# Patient Record
Sex: FEMALE | Race: OTHER | NOT HISPANIC OR LATINO
[De-identification: names, ages, dates, MRNs, and addresses within clinical notes are randomized per-mention and may not be internally consistent; named-entity substitution may affect disease eponyms.]

---

## 2023-10-09 ENCOUNTER — ASOB RESULT (OUTPATIENT)
Age: 33
End: 2023-10-09

## 2023-10-09 ENCOUNTER — APPOINTMENT (OUTPATIENT)
Dept: ANTEPARTUM | Facility: CLINIC | Age: 33
End: 2023-10-09
Payer: MEDICAID

## 2023-10-09 PROCEDURE — 93976 VASCULAR STUDY: CPT

## 2023-10-09 PROCEDURE — 76813 OB US NUCHAL MEAS 1 GEST: CPT

## 2023-12-05 ENCOUNTER — APPOINTMENT (OUTPATIENT)
Dept: ANTEPARTUM | Facility: CLINIC | Age: 33
End: 2023-12-05
Payer: MEDICAID

## 2023-12-05 ENCOUNTER — ASOB RESULT (OUTPATIENT)
Age: 33
End: 2023-12-05

## 2023-12-05 PROCEDURE — 76817 TRANSVAGINAL US OBSTETRIC: CPT

## 2023-12-05 PROCEDURE — 76805 OB US >/= 14 WKS SNGL FETUS: CPT

## 2024-01-30 ENCOUNTER — ASOB RESULT (OUTPATIENT)
Age: 34
End: 2024-01-30

## 2024-01-30 ENCOUNTER — APPOINTMENT (OUTPATIENT)
Dept: ANTEPARTUM | Facility: CLINIC | Age: 34
End: 2024-01-30
Payer: MEDICAID

## 2024-01-30 PROCEDURE — 76816 OB US FOLLOW-UP PER FETUS: CPT

## 2024-01-30 PROCEDURE — 76819 FETAL BIOPHYS PROFIL W/O NST: CPT

## 2024-01-30 PROCEDURE — 76820 UMBILICAL ARTERY ECHO: CPT | Mod: 59

## 2024-03-09 ENCOUNTER — OUTPATIENT (OUTPATIENT)
Dept: OUTPATIENT SERVICES | Facility: HOSPITAL | Age: 34
LOS: 1 days | End: 2024-03-09
Payer: MEDICARE

## 2024-03-09 VITALS
DIASTOLIC BLOOD PRESSURE: 83 MMHG | HEART RATE: 113 BPM | TEMPERATURE: 98 F | SYSTOLIC BLOOD PRESSURE: 129 MMHG | OXYGEN SATURATION: 97 % | RESPIRATION RATE: 18 BRPM

## 2024-03-09 VITALS
RESPIRATION RATE: 18 BRPM | DIASTOLIC BLOOD PRESSURE: 67 MMHG | OXYGEN SATURATION: 99 % | TEMPERATURE: 98 F | HEART RATE: 108 BPM | SYSTOLIC BLOOD PRESSURE: 114 MMHG

## 2024-03-09 DIAGNOSIS — O26.899 OTHER SPECIFIED PREGNANCY RELATED CONDITIONS, UNSPECIFIED TRIMESTER: ICD-10-CM

## 2024-03-09 LAB
ALBUMIN SERPL ELPH-MCNC: 3.6 G/DL — SIGNIFICANT CHANGE UP (ref 3.3–5)
ALP SERPL-CCNC: 141 U/L — HIGH (ref 40–120)
ALT FLD-CCNC: 13 U/L — SIGNIFICANT CHANGE UP (ref 10–45)
ANION GAP SERPL CALC-SCNC: 12 MMOL/L — SIGNIFICANT CHANGE UP (ref 5–17)
APPEARANCE UR: CLEAR — SIGNIFICANT CHANGE UP
APTT BLD: 26.2 SEC — SIGNIFICANT CHANGE UP (ref 24.5–35.6)
AST SERPL-CCNC: 12 U/L — SIGNIFICANT CHANGE UP (ref 10–40)
BACTERIA # UR AUTO: ABNORMAL /HPF
BASOPHILS # BLD AUTO: 0.01 K/UL — SIGNIFICANT CHANGE UP (ref 0–0.2)
BASOPHILS NFR BLD AUTO: 0.1 % — SIGNIFICANT CHANGE UP (ref 0–2)
BILIRUB SERPL-MCNC: 0.2 MG/DL — SIGNIFICANT CHANGE UP (ref 0.2–1.2)
BILIRUB UR-MCNC: NEGATIVE — SIGNIFICANT CHANGE UP
BLD GP AB SCN SERPL QL: NEGATIVE — SIGNIFICANT CHANGE UP
BUN SERPL-MCNC: 4 MG/DL — LOW (ref 7–23)
CALCIUM SERPL-MCNC: 8.8 MG/DL — SIGNIFICANT CHANGE UP (ref 8.4–10.5)
CAST: 2 /LPF — SIGNIFICANT CHANGE UP (ref 0–4)
CHLORIDE SERPL-SCNC: 104 MMOL/L — SIGNIFICANT CHANGE UP (ref 96–108)
CO2 SERPL-SCNC: 22 MMOL/L — SIGNIFICANT CHANGE UP (ref 22–31)
COLOR SPEC: YELLOW — SIGNIFICANT CHANGE UP
CREAT ?TM UR-MCNC: 68 MG/DL — SIGNIFICANT CHANGE UP
CREAT SERPL-MCNC: 0.48 MG/DL — LOW (ref 0.5–1.3)
DIFF PNL FLD: NEGATIVE — SIGNIFICANT CHANGE UP
EGFR: 127 ML/MIN/1.73M2 — SIGNIFICANT CHANGE UP
EOSINOPHIL # BLD AUTO: 0.04 K/UL — SIGNIFICANT CHANGE UP (ref 0–0.5)
EOSINOPHIL NFR BLD AUTO: 0.3 % — SIGNIFICANT CHANGE UP (ref 0–6)
FIBRINOGEN PPP-MCNC: 542 MG/DL — HIGH (ref 200–445)
GLUCOSE SERPL-MCNC: 83 MG/DL — SIGNIFICANT CHANGE UP (ref 70–99)
GLUCOSE UR QL: NEGATIVE MG/DL — SIGNIFICANT CHANGE UP
HCT VFR BLD CALC: 36.2 % — SIGNIFICANT CHANGE UP (ref 34.5–45)
HGB BLD-MCNC: 11.9 G/DL — SIGNIFICANT CHANGE UP (ref 11.5–15.5)
IMM GRANULOCYTES NFR BLD AUTO: 0.4 % — SIGNIFICANT CHANGE UP (ref 0–0.9)
INR BLD: 0.95 — SIGNIFICANT CHANGE UP (ref 0.85–1.18)
KETONES UR-MCNC: NEGATIVE MG/DL — SIGNIFICANT CHANGE UP
LDH SERPL L TO P-CCNC: 160 U/L — SIGNIFICANT CHANGE UP (ref 50–242)
LEUKOCYTE ESTERASE UR-ACNC: ABNORMAL
LYMPHOCYTES # BLD AUTO: 17.1 % — SIGNIFICANT CHANGE UP (ref 13–44)
LYMPHOCYTES # BLD AUTO: 2.37 K/UL — SIGNIFICANT CHANGE UP (ref 1–3.3)
MCHC RBC-ENTMCNC: 27.7 PG — SIGNIFICANT CHANGE UP (ref 27–34)
MCHC RBC-ENTMCNC: 32.9 GM/DL — SIGNIFICANT CHANGE UP (ref 32–36)
MCV RBC AUTO: 84.2 FL — SIGNIFICANT CHANGE UP (ref 80–100)
MONOCYTES # BLD AUTO: 0.88 K/UL — SIGNIFICANT CHANGE UP (ref 0–0.9)
MONOCYTES NFR BLD AUTO: 6.3 % — SIGNIFICANT CHANGE UP (ref 2–14)
NEUTROPHILS # BLD AUTO: 10.52 K/UL — HIGH (ref 1.8–7.4)
NEUTROPHILS NFR BLD AUTO: 75.8 % — SIGNIFICANT CHANGE UP (ref 43–77)
NITRITE UR-MCNC: NEGATIVE — SIGNIFICANT CHANGE UP
NRBC # BLD: 0 /100 WBCS — SIGNIFICANT CHANGE UP (ref 0–0)
PH UR: 7 — SIGNIFICANT CHANGE UP (ref 5–8)
PLATELET # BLD AUTO: 382 K/UL — SIGNIFICANT CHANGE UP (ref 150–400)
POTASSIUM SERPL-MCNC: 4 MMOL/L — SIGNIFICANT CHANGE UP (ref 3.5–5.3)
POTASSIUM SERPL-SCNC: 4 MMOL/L — SIGNIFICANT CHANGE UP (ref 3.5–5.3)
PROT ?TM UR-MCNC: 10 MG/DL — SIGNIFICANT CHANGE UP (ref 0–12)
PROT SERPL-MCNC: 6.6 G/DL — SIGNIFICANT CHANGE UP (ref 6–8.3)
PROT UR-MCNC: NEGATIVE MG/DL — SIGNIFICANT CHANGE UP
PROT/CREAT UR-RTO: 0.1 RATIO — SIGNIFICANT CHANGE UP (ref 0–0.2)
PROTHROM AB SERPL-ACNC: 10.9 SEC — SIGNIFICANT CHANGE UP (ref 9.5–13)
RBC # BLD: 4.3 M/UL — SIGNIFICANT CHANGE UP (ref 3.8–5.2)
RBC # FLD: 14.7 % — HIGH (ref 10.3–14.5)
RBC CASTS # UR COMP ASSIST: 0 /HPF — SIGNIFICANT CHANGE UP (ref 0–4)
RH IG SCN BLD-IMP: POSITIVE — SIGNIFICANT CHANGE UP
RH IG SCN BLD-IMP: POSITIVE — SIGNIFICANT CHANGE UP
SODIUM SERPL-SCNC: 138 MMOL/L — SIGNIFICANT CHANGE UP (ref 135–145)
SP GR SPEC: 1.01 — SIGNIFICANT CHANGE UP (ref 1–1.03)
SQUAMOUS # UR AUTO: 17 /HPF — HIGH (ref 0–5)
URATE SERPL-MCNC: 3.1 MG/DL — SIGNIFICANT CHANGE UP (ref 2.5–7)
UROBILINOGEN FLD QL: 0.2 MG/DL — SIGNIFICANT CHANGE UP (ref 0.2–1)
WBC # BLD: 13.88 K/UL — HIGH (ref 3.8–10.5)
WBC # FLD AUTO: 13.88 K/UL — HIGH (ref 3.8–10.5)
WBC UR QL: 4 /HPF — SIGNIFICANT CHANGE UP (ref 0–5)

## 2024-03-09 PROCEDURE — 81001 URINALYSIS AUTO W/SCOPE: CPT

## 2024-03-09 PROCEDURE — 86900 BLOOD TYPING SEROLOGIC ABO: CPT

## 2024-03-09 PROCEDURE — 85610 PROTHROMBIN TIME: CPT

## 2024-03-09 PROCEDURE — 82570 ASSAY OF URINE CREATININE: CPT

## 2024-03-09 PROCEDURE — 80053 COMPREHEN METABOLIC PANEL: CPT

## 2024-03-09 PROCEDURE — 83615 LACTATE (LD) (LDH) ENZYME: CPT

## 2024-03-09 PROCEDURE — 96374 THER/PROPH/DIAG INJ IV PUSH: CPT

## 2024-03-09 PROCEDURE — 96361 HYDRATE IV INFUSION ADD-ON: CPT

## 2024-03-09 PROCEDURE — 84156 ASSAY OF PROTEIN URINE: CPT

## 2024-03-09 PROCEDURE — 86901 BLOOD TYPING SEROLOGIC RH(D): CPT

## 2024-03-09 PROCEDURE — 76815 OB US LIMITED FETUS(S): CPT

## 2024-03-09 PROCEDURE — 85025 COMPLETE CBC W/AUTO DIFF WBC: CPT

## 2024-03-09 PROCEDURE — 36415 COLL VENOUS BLD VENIPUNCTURE: CPT

## 2024-03-09 PROCEDURE — 85730 THROMBOPLASTIN TIME PARTIAL: CPT

## 2024-03-09 PROCEDURE — 99214 OFFICE O/P EST MOD 30 MIN: CPT

## 2024-03-09 PROCEDURE — 84550 ASSAY OF BLOOD/URIC ACID: CPT

## 2024-03-09 PROCEDURE — 85384 FIBRINOGEN ACTIVITY: CPT

## 2024-03-09 PROCEDURE — 76818 FETAL BIOPHYS PROFILE W/NST: CPT

## 2024-03-09 PROCEDURE — 59025 FETAL NON-STRESS TEST: CPT

## 2024-03-09 PROCEDURE — 86850 RBC ANTIBODY SCREEN: CPT

## 2024-03-09 RX ORDER — SODIUM CHLORIDE 9 MG/ML
1000 INJECTION, SOLUTION INTRAVENOUS ONCE
Refills: 0 | Status: COMPLETED | OUTPATIENT
Start: 2024-03-09 | End: 2024-03-09

## 2024-03-09 RX ORDER — ACETAMINOPHEN 500 MG
1000 TABLET ORAL ONCE
Refills: 0 | Status: COMPLETED | OUTPATIENT
Start: 2024-03-09 | End: 2024-03-09

## 2024-03-09 RX ADMIN — SODIUM CHLORIDE 1000 MILLILITER(S): 9 INJECTION, SOLUTION INTRAVENOUS at 20:44

## 2024-03-09 RX ADMIN — Medication 400 MILLIGRAM(S): at 20:45

## 2024-03-09 NOTE — OB PROVIDER TRIAGE NOTE - NSOBPROVIDERNOTE_OBGYN_ALL_OB_FT
Subjective:  HPI: 33y yo Female  at 34w5d presents for lower abdominal pain. She woke up this morning with sharp pelvic pain that is constant all day. She does not think they are contraction-like, as she knows what contractions are due to her first pregnancy. She has not noticed anything makes it better or worse. She denies dysuria, frequency, urgency, headache, changes in vision, RUQ pain. Of note, she has a history of 2 prior  deliveries and denies true incisional pain but endorses pain 1 inch above her prio Pfannensteil incision.  - Fetal movement: endorses  - Contractions: denies  - Leakage of fluid: denies  - Vaginal bleeding: denies    Antepartum:  - Pregnancy type: spontaneous  - Testing: NIPT wnl   - Anatomy scan: wnl  - GCT/GTT: passed  - Hypertensive disorders of pregnancy: chronic hypertension (diagnosed ; currently taking labetalol 300mg BID; good control at home)  - GBS status: negative  - EFW: (2024) 1548g 69%ile    - ObHx: G1  primary C/S for arrest of dilation, BW 3500g; G2 2017 C/S  - GynHx: denies  - PMH: denies  - PSH: denies  - SH: denies toxic habits  - Meds: Prenatal vitamins; Labetalol 300mg BID  - Allergies: No Known Allergies        Objective:  T(C): 36.9 (24 @ 19:09), Max: 36.9 (24 @ 19:09)  HR: 113 (24 @ 19:09) (113 - 113)  BP: 129/83 (24 @ 19:09) (129/83 - 129/83)  RR: 18 (24 @ 19:09) (18 - 18)  SpO2: 97% (24 @ 19:09) (97% - 97%)  General: No apparent distress; Lying comfortably in bed  Pulmonary: No increased work of breathing  Abdomen: Soft; Gravid; CVA tenderness negative bilaterally; no rebound or guarding in all quadrants; tenderness to deep palpation above pubic symphysis  Extremities: Trace pedal edema bilaterally; No calf tenderness bilaterally  Neurological: Gross movements intact  Psychiatric: Appropriate mood and affect  Skin: No rashes or jaundice    SVE: closed/long  TAUS: cephalic fetus; posterior placenta; BPP 8/8; DVP 3.4cm (sonogram attached)    FHT: baseline 145; moderate variability; +accels; -decels; reactive and reassuring  TOCO: irregular contractions q1-3min on admission, transitioned to uterine irritability after IV hydration          Assessment:      Plan:  -   - GBS status as above:   - All questions were answered and concerns addressed. Patient verbally expressed understanding.  - Discussed with senior resident   - Discussed with attending physician

## 2024-03-09 NOTE — OB RN TRIAGE NOTE - NS_TRIAGEPROVIDERNOTIFIEDBY_OBGYN_ALL_OB_FT
Patient complaining of shortness of breath. SpO2 87% on room air. 2L oxygen applied via nasal cannula - SpO2 now 94%. Chest x-ray ordered by Dr Shelley.   MYKE Alexandre, RN

## 2024-03-10 RX ORDER — INFLUENZA VIRUS VACCINE 15; 15; 15; 15 UG/.5ML; UG/.5ML; UG/.5ML; UG/.5ML
0.5 SUSPENSION INTRAMUSCULAR ONCE
Refills: 0 | Status: DISCONTINUED | OUTPATIENT
Start: 2024-03-10 | End: 2024-03-24

## 2024-03-10 NOTE — DISCHARGE NOTE OB - PATIENT PORTAL LINK FT
You can access the FollowMyHealth Patient Portal offered by [facility] by registering at the following website: [email or web address]. By joining [employer]’s FollowMyHealth portal, you will also be able to view your health information using other applications (apps) compatible with our system.

## 2024-03-11 DIAGNOSIS — R10.2 PELVIC AND PERINEAL PAIN: ICD-10-CM

## 2024-03-11 DIAGNOSIS — O34.219 MATERNAL CARE FOR UNSPECIFIED TYPE SCAR FROM PREVIOUS CESAREAN DELIVERY: ICD-10-CM

## 2024-03-11 DIAGNOSIS — O10.913 UNSPECIFIED PRE-EXISTING HYPERTENSION COMPLICATING PREGNANCY, THIRD TRIMESTER: ICD-10-CM

## 2024-03-11 DIAGNOSIS — O09.523 SUPERVISION OF ELDERLY MULTIGRAVIDA, THIRD TRIMESTER: ICD-10-CM

## 2024-03-11 DIAGNOSIS — O26.893 OTHER SPECIFIED PREGNANCY RELATED CONDITIONS, THIRD TRIMESTER: ICD-10-CM

## 2024-03-11 DIAGNOSIS — Z3A.33 33 WEEKS GESTATION OF PREGNANCY: ICD-10-CM

## 2024-03-26 ENCOUNTER — APPOINTMENT (OUTPATIENT)
Dept: ANTEPARTUM | Facility: CLINIC | Age: 34
End: 2024-03-26
Payer: MEDICAID

## 2024-03-26 ENCOUNTER — ASOB RESULT (OUTPATIENT)
Age: 34
End: 2024-03-26

## 2024-03-26 PROCEDURE — 76816 OB US FOLLOW-UP PER FETUS: CPT

## 2024-03-26 PROCEDURE — 76820 UMBILICAL ARTERY ECHO: CPT | Mod: 59

## 2024-03-26 PROCEDURE — 76818 FETAL BIOPHYS PROFILE W/NST: CPT

## 2024-03-27 ENCOUNTER — INPATIENT (INPATIENT)
Facility: HOSPITAL | Age: 34
LOS: 2 days | Discharge: HOME | End: 2024-03-30
Attending: OBSTETRICS & GYNECOLOGY | Admitting: OBSTETRICS & GYNECOLOGY
Payer: COMMERCIAL

## 2024-03-27 ENCOUNTER — TRANSCRIPTION ENCOUNTER (OUTPATIENT)
Age: 34
End: 2024-03-27

## 2024-03-27 ENCOUNTER — EMERGENCY (EMERGENCY)
Facility: HOSPITAL | Age: 34
LOS: 1 days | Discharge: HOME | End: 2024-03-27
Attending: STUDENT IN AN ORGANIZED HEALTH CARE EDUCATION/TRAINING PROGRAM | Admitting: STUDENT IN AN ORGANIZED HEALTH CARE EDUCATION/TRAINING PROGRAM
Payer: COMMERCIAL

## 2024-03-27 VITALS
DIASTOLIC BLOOD PRESSURE: 85 MMHG | TEMPERATURE: 98 F | RESPIRATION RATE: 18 BRPM | HEART RATE: 102 BPM | SYSTOLIC BLOOD PRESSURE: 127 MMHG

## 2024-03-27 VITALS
WEIGHT: 181 LBS | DIASTOLIC BLOOD PRESSURE: 97 MMHG | OXYGEN SATURATION: 97 % | SYSTOLIC BLOOD PRESSURE: 144 MMHG | TEMPERATURE: 98 F | RESPIRATION RATE: 19 BRPM | HEIGHT: 62 IN | HEART RATE: 108 BPM

## 2024-03-27 DIAGNOSIS — Z3A.37 37 WEEKS GESTATION OF PREGNANCY: ICD-10-CM

## 2024-03-27 DIAGNOSIS — O47.1 FALSE LABOR AT OR AFTER 37 COMPLETED WEEKS OF GESTATION: ICD-10-CM

## 2024-03-27 DIAGNOSIS — I10 ESSENTIAL (PRIMARY) HYPERTENSION: ICD-10-CM

## 2024-03-27 DIAGNOSIS — O26.899 OTHER SPECIFIED PREGNANCY RELATED CONDITIONS, UNSPECIFIED TRIMESTER: ICD-10-CM

## 2024-03-27 PROCEDURE — 99282 EMERGENCY DEPT VISIT SF MDM: CPT

## 2024-03-27 PROCEDURE — 99283 EMERGENCY DEPT VISIT LOW MDM: CPT

## 2024-03-27 RX ORDER — SODIUM CHLORIDE 9 MG/ML
1000 INJECTION, SOLUTION INTRAVENOUS ONCE
Refills: 0 | Status: COMPLETED | OUTPATIENT
Start: 2024-03-27 | End: 2024-03-27

## 2024-03-27 NOTE — ED PROVIDER NOTE - CLINICAL SUMMARY MEDICAL DECISION MAKING FREE TEXT BOX
Pregnancy 37 wks. Contractions/pain for 3 hours.  No further w/u required in ED, will send to OB for further evaluation.

## 2024-03-27 NOTE — ED ADULT TRIAGE NOTE - CHIEF COMPLAINT QUOTE
patient presenting to the ED with pelvic pain x 3 hr. Patient is 37 weeks pregnant, denies bleeding, no nausea.

## 2024-03-27 NOTE — ED PROVIDER NOTE - OBJECTIVE STATEMENT
Yes
34 yo F , 27 wks pregnant w PMH of HTN, p/w contractions and abd pain for 3 hours. Pt states pains are intermittent, wrap around L-side of abd and L flank. No urinary symptoms, fever, n/v/d, or HA.

## 2024-03-27 NOTE — ED PROVIDER NOTE - PATIENT'S SEXUAL ORIENTATION
Medications have been prescribed to you today please pick them up at your pharmacy they have been sent electronically.  If your pharmacy advises you that any of these prescriptions require prior authorization please allow us approximately 10 business days to complete the prior authorization process and hear back from your insurance company.    If you require refills on medications prior to your next scheduled appointment please allow 48 to 72 hours for completion of this request and please login to the live well application through which we can communicate to you that the medications have been sent to the pharmacy.    If we have ordered diagnostic studies such as CT scans, MRIs, ultrasounds, mammograms, they often require prior authorization by your insurance company.  In order to facilitate us obtaining this authorization for you it is imperative for you to schedule the appointment and notify us of the time and date of the appointment so that we can complete the authorization process for you.  Should you fail to notify us of the time and date of your appointment cannot guarantee that this procedure will be properly authorized by your insurance company.     Unknown

## 2024-03-27 NOTE — ED PROVIDER NOTE - PHYSICAL EXAMINATION
CONSTITUTIONAL: Non-toxic; in no apparent distress  HEAD: Normocephalic; atraumatic  EYES: PERRL; EOM intact   ENMT: External appears normal  NECK: Supple; non-tender  CARD: Normal S1, S2; no murmurs, rubs, or gallops  RESP: Normal chest excursion with respiration; breath sounds clear and equal bilaterally  ABD: Soft, + gravid, Mild L-sided ttp  EXT: Normal ROM in all four extremities; non-tender to palpation, no peripheral edema  SKIN: Warm, dry, no rash  NEURO:  No focal neurological deficiencies

## 2024-03-28 DIAGNOSIS — Z98.891 HISTORY OF UTERINE SCAR FROM PREVIOUS SURGERY: Chronic | ICD-10-CM

## 2024-03-28 LAB
ALBUMIN SERPL ELPH-MCNC: 3.7 G/DL — SIGNIFICANT CHANGE UP (ref 3.3–5)
ALP SERPL-CCNC: 146 U/L — HIGH (ref 40–120)
ALT FLD-CCNC: 24 U/L — SIGNIFICANT CHANGE UP (ref 10–45)
ANION GAP SERPL CALC-SCNC: 12 MMOL/L — SIGNIFICANT CHANGE UP (ref 5–17)
APPEARANCE UR: ABNORMAL
APTT BLD: 25.5 SEC — SIGNIFICANT CHANGE UP (ref 24.5–35.6)
AST SERPL-CCNC: 16 U/L — SIGNIFICANT CHANGE UP (ref 10–40)
BACTERIA # UR AUTO: ABNORMAL /HPF
BASOPHILS # BLD AUTO: 0.03 K/UL — SIGNIFICANT CHANGE UP (ref 0–0.2)
BASOPHILS # BLD AUTO: 0.03 K/UL — SIGNIFICANT CHANGE UP (ref 0–0.2)
BASOPHILS NFR BLD AUTO: 0.2 % — SIGNIFICANT CHANGE UP (ref 0–2)
BASOPHILS NFR BLD AUTO: 0.3 % — SIGNIFICANT CHANGE UP (ref 0–2)
BILIRUB SERPL-MCNC: 0.2 MG/DL — SIGNIFICANT CHANGE UP (ref 0.2–1.2)
BILIRUB UR-MCNC: NEGATIVE — SIGNIFICANT CHANGE UP
BLD GP AB SCN SERPL QL: NEGATIVE — SIGNIFICANT CHANGE UP
BUN SERPL-MCNC: 7 MG/DL — SIGNIFICANT CHANGE UP (ref 7–23)
CALCIUM SERPL-MCNC: 9.5 MG/DL — SIGNIFICANT CHANGE UP (ref 8.4–10.5)
CAST: 1 /LPF — SIGNIFICANT CHANGE UP (ref 0–4)
CHLORIDE SERPL-SCNC: 104 MMOL/L — SIGNIFICANT CHANGE UP (ref 96–108)
CO2 SERPL-SCNC: 21 MMOL/L — LOW (ref 22–31)
COLOR SPEC: YELLOW — SIGNIFICANT CHANGE UP
CREAT ?TM UR-MCNC: 29 MG/DL — SIGNIFICANT CHANGE UP
CREAT SERPL-MCNC: 0.51 MG/DL — SIGNIFICANT CHANGE UP (ref 0.5–1.3)
DIFF PNL FLD: ABNORMAL
EGFR: 125 ML/MIN/1.73M2 — SIGNIFICANT CHANGE UP
EOSINOPHIL # BLD AUTO: 0 K/UL — SIGNIFICANT CHANGE UP (ref 0–0.5)
EOSINOPHIL # BLD AUTO: 0.14 K/UL — SIGNIFICANT CHANGE UP (ref 0–0.5)
EOSINOPHIL NFR BLD AUTO: 0 % — SIGNIFICANT CHANGE UP (ref 0–6)
EOSINOPHIL NFR BLD AUTO: 1.5 % — SIGNIFICANT CHANGE UP (ref 0–6)
FIBRINOGEN PPP-MCNC: 485 MG/DL — HIGH (ref 200–445)
GLUCOSE SERPL-MCNC: 107 MG/DL — HIGH (ref 70–99)
GLUCOSE UR QL: NEGATIVE MG/DL — SIGNIFICANT CHANGE UP
HCT VFR BLD CALC: 35.5 % — SIGNIFICANT CHANGE UP (ref 34.5–45)
HCT VFR BLD CALC: 36.2 % — SIGNIFICANT CHANGE UP (ref 34.5–45)
HGB BLD-MCNC: 11.8 G/DL — SIGNIFICANT CHANGE UP (ref 11.5–15.5)
HGB BLD-MCNC: 11.9 G/DL — SIGNIFICANT CHANGE UP (ref 11.5–15.5)
IMM GRANULOCYTES NFR BLD AUTO: 0.4 % — SIGNIFICANT CHANGE UP (ref 0–0.9)
IMM GRANULOCYTES NFR BLD AUTO: 0.9 % — SIGNIFICANT CHANGE UP (ref 0–0.9)
INR BLD: 0.92 — SIGNIFICANT CHANGE UP (ref 0.85–1.18)
KETONES UR-MCNC: NEGATIVE MG/DL — SIGNIFICANT CHANGE UP
LDH SERPL L TO P-CCNC: 146 U/L — SIGNIFICANT CHANGE UP (ref 50–242)
LEUKOCYTE ESTERASE UR-ACNC: NEGATIVE — SIGNIFICANT CHANGE UP
LYMPHOCYTES # BLD AUTO: 1.16 K/UL — SIGNIFICANT CHANGE UP (ref 1–3.3)
LYMPHOCYTES # BLD AUTO: 2.14 K/UL — SIGNIFICANT CHANGE UP (ref 1–3.3)
LYMPHOCYTES # BLD AUTO: 23.4 % — SIGNIFICANT CHANGE UP (ref 13–44)
LYMPHOCYTES # BLD AUTO: 7 % — LOW (ref 13–44)
MCHC RBC-ENTMCNC: 27.5 PG — SIGNIFICANT CHANGE UP (ref 27–34)
MCHC RBC-ENTMCNC: 27.9 PG — SIGNIFICANT CHANGE UP (ref 27–34)
MCHC RBC-ENTMCNC: 32.6 GM/DL — SIGNIFICANT CHANGE UP (ref 32–36)
MCHC RBC-ENTMCNC: 33.5 GM/DL — SIGNIFICANT CHANGE UP (ref 32–36)
MCV RBC AUTO: 83.1 FL — SIGNIFICANT CHANGE UP (ref 80–100)
MCV RBC AUTO: 84.4 FL — SIGNIFICANT CHANGE UP (ref 80–100)
MONOCYTES # BLD AUTO: 0.67 K/UL — SIGNIFICANT CHANGE UP (ref 0–0.9)
MONOCYTES # BLD AUTO: 0.87 K/UL — SIGNIFICANT CHANGE UP (ref 0–0.9)
MONOCYTES NFR BLD AUTO: 4.1 % — SIGNIFICANT CHANGE UP (ref 2–14)
MONOCYTES NFR BLD AUTO: 9.5 % — SIGNIFICANT CHANGE UP (ref 2–14)
NEUTROPHILS # BLD AUTO: 14.54 K/UL — HIGH (ref 1.8–7.4)
NEUTROPHILS # BLD AUTO: 5.9 K/UL — SIGNIFICANT CHANGE UP (ref 1.8–7.4)
NEUTROPHILS NFR BLD AUTO: 64.4 % — SIGNIFICANT CHANGE UP (ref 43–77)
NEUTROPHILS NFR BLD AUTO: 88.3 % — HIGH (ref 43–77)
NITRITE UR-MCNC: NEGATIVE — SIGNIFICANT CHANGE UP
NRBC # BLD: 0 /100 WBCS — SIGNIFICANT CHANGE UP (ref 0–0)
NRBC # BLD: 0 /100 WBCS — SIGNIFICANT CHANGE UP (ref 0–0)
PH UR: 7 — SIGNIFICANT CHANGE UP (ref 5–8)
PLATELET # BLD AUTO: 327 K/UL — SIGNIFICANT CHANGE UP (ref 150–400)
PLATELET # BLD AUTO: 329 K/UL — SIGNIFICANT CHANGE UP (ref 150–400)
POTASSIUM SERPL-MCNC: 4 MMOL/L — SIGNIFICANT CHANGE UP (ref 3.5–5.3)
POTASSIUM SERPL-SCNC: 4 MMOL/L — SIGNIFICANT CHANGE UP (ref 3.5–5.3)
PROT ?TM UR-MCNC: 6 MG/DL — SIGNIFICANT CHANGE UP (ref 0–12)
PROT SERPL-MCNC: 6.8 G/DL — SIGNIFICANT CHANGE UP (ref 6–8.3)
PROT UR-MCNC: NEGATIVE MG/DL — SIGNIFICANT CHANGE UP
PROT/CREAT UR-RTO: 0.2 RATIO — SIGNIFICANT CHANGE UP (ref 0–0.2)
PROTHROM AB SERPL-ACNC: 10.5 SEC — SIGNIFICANT CHANGE UP (ref 9.5–13)
RBC # BLD: 4.27 M/UL — SIGNIFICANT CHANGE UP (ref 3.8–5.2)
RBC # BLD: 4.29 M/UL — SIGNIFICANT CHANGE UP (ref 3.8–5.2)
RBC # FLD: 14.9 % — HIGH (ref 10.3–14.5)
RBC # FLD: 14.9 % — HIGH (ref 10.3–14.5)
RBC CASTS # UR COMP ASSIST: 1 /HPF — SIGNIFICANT CHANGE UP (ref 0–4)
RH IG SCN BLD-IMP: POSITIVE — SIGNIFICANT CHANGE UP
SODIUM SERPL-SCNC: 137 MMOL/L — SIGNIFICANT CHANGE UP (ref 135–145)
SP GR SPEC: 1.01 — SIGNIFICANT CHANGE UP (ref 1–1.03)
SQUAMOUS # UR AUTO: >36 /HPF — HIGH (ref 0–5)
URATE SERPL-MCNC: 3.7 MG/DL — SIGNIFICANT CHANGE UP (ref 2.5–7)
UROBILINOGEN FLD QL: 0.2 MG/DL — SIGNIFICANT CHANGE UP (ref 0.2–1)
WBC # BLD: 16.46 K/UL — HIGH (ref 3.8–10.5)
WBC # BLD: 9.16 K/UL — SIGNIFICANT CHANGE UP (ref 3.8–10.5)
WBC # FLD AUTO: 16.46 K/UL — HIGH (ref 3.8–10.5)
WBC # FLD AUTO: 9.16 K/UL — SIGNIFICANT CHANGE UP (ref 3.8–10.5)
WBC UR QL: 4 /HPF — SIGNIFICANT CHANGE UP (ref 0–5)

## 2024-03-28 PROCEDURE — 88304 TISSUE EXAM BY PATHOLOGIST: CPT | Mod: 26

## 2024-03-28 PROCEDURE — 88307 TISSUE EXAM BY PATHOLOGIST: CPT | Mod: 26

## 2024-03-28 RX ORDER — DIPHENHYDRAMINE HCL 50 MG
25 CAPSULE ORAL EVERY 6 HOURS
Refills: 0 | Status: DISCONTINUED | OUTPATIENT
Start: 2024-03-28 | End: 2024-03-30

## 2024-03-28 RX ORDER — DEXAMETHASONE 0.5 MG/5ML
4 ELIXIR ORAL EVERY 6 HOURS
Refills: 0 | Status: DISCONTINUED | OUTPATIENT
Start: 2024-03-28 | End: 2024-03-30

## 2024-03-28 RX ORDER — ACETAMINOPHEN 500 MG
975 TABLET ORAL
Refills: 0 | Status: DISCONTINUED | OUTPATIENT
Start: 2024-03-28 | End: 2024-03-30

## 2024-03-28 RX ORDER — SODIUM CHLORIDE 9 MG/ML
1000 INJECTION, SOLUTION INTRAVENOUS
Refills: 0 | Status: DISCONTINUED | OUTPATIENT
Start: 2024-03-28 | End: 2024-03-30

## 2024-03-28 RX ORDER — OXYCODONE HYDROCHLORIDE 5 MG/1
5 TABLET ORAL
Refills: 0 | Status: DISCONTINUED | OUTPATIENT
Start: 2024-03-28 | End: 2024-03-30

## 2024-03-28 RX ORDER — ONDANSETRON 8 MG/1
4 TABLET, FILM COATED ORAL EVERY 6 HOURS
Refills: 0 | Status: DISCONTINUED | OUTPATIENT
Start: 2024-03-28 | End: 2024-03-30

## 2024-03-28 RX ORDER — LABETALOL HCL 100 MG
300 TABLET ORAL EVERY 12 HOURS
Refills: 0 | Status: DISCONTINUED | OUTPATIENT
Start: 2024-03-28 | End: 2024-03-30

## 2024-03-28 RX ORDER — LANOLIN
1 OINTMENT (GRAM) TOPICAL EVERY 6 HOURS
Refills: 0 | Status: DISCONTINUED | OUTPATIENT
Start: 2024-03-28 | End: 2024-03-30

## 2024-03-28 RX ORDER — SODIUM CHLORIDE 9 MG/ML
1000 INJECTION, SOLUTION INTRAVENOUS ONCE
Refills: 0 | Status: COMPLETED | OUTPATIENT
Start: 2024-03-28 | End: 2024-03-28

## 2024-03-28 RX ORDER — KETOROLAC TROMETHAMINE 30 MG/ML
30 SYRINGE (ML) INJECTION EVERY 6 HOURS
Refills: 0 | Status: DISCONTINUED | OUTPATIENT
Start: 2024-03-28 | End: 2024-03-28

## 2024-03-28 RX ORDER — OXYTOCIN 10 UNIT/ML
333.33 VIAL (ML) INJECTION
Qty: 20 | Refills: 0 | Status: DISCONTINUED | OUTPATIENT
Start: 2024-03-28 | End: 2024-03-30

## 2024-03-28 RX ORDER — LABETALOL HCL 100 MG
1 TABLET ORAL
Refills: 0 | DISCHARGE

## 2024-03-28 RX ORDER — MAGNESIUM HYDROXIDE 400 MG/1
30 TABLET, CHEWABLE ORAL
Refills: 0 | Status: DISCONTINUED | OUTPATIENT
Start: 2024-03-28 | End: 2024-03-30

## 2024-03-28 RX ORDER — CHLORHEXIDINE GLUCONATE 213 G/1000ML
1 SOLUTION TOPICAL DAILY
Refills: 0 | Status: DISCONTINUED | OUTPATIENT
Start: 2024-03-28 | End: 2024-03-30

## 2024-03-28 RX ORDER — CEFAZOLIN SODIUM 1 G
3000 VIAL (EA) INJECTION ONCE
Refills: 0 | Status: COMPLETED | OUTPATIENT
Start: 2024-03-28 | End: 2024-03-28

## 2024-03-28 RX ORDER — TETANUS TOXOID, REDUCED DIPHTHERIA TOXOID AND ACELLULAR PERTUSSIS VACCINE, ADSORBED 5; 2.5; 8; 8; 2.5 [IU]/.5ML; [IU]/.5ML; UG/.5ML; UG/.5ML; UG/.5ML
0.5 SUSPENSION INTRAMUSCULAR ONCE
Refills: 0 | Status: DISCONTINUED | OUTPATIENT
Start: 2024-03-28 | End: 2024-03-30

## 2024-03-28 RX ORDER — SIMETHICONE 80 MG/1
80 TABLET, CHEWABLE ORAL EVERY 4 HOURS
Refills: 0 | Status: DISCONTINUED | OUTPATIENT
Start: 2024-03-28 | End: 2024-03-30

## 2024-03-28 RX ORDER — CITRIC ACID/SODIUM CITRATE 300-500 MG
30 SOLUTION, ORAL ORAL ONCE
Refills: 0 | Status: COMPLETED | OUTPATIENT
Start: 2024-03-28 | End: 2024-03-28

## 2024-03-28 RX ORDER — FAMOTIDINE 10 MG/ML
20 INJECTION INTRAVENOUS ONCE
Refills: 0 | Status: COMPLETED | OUTPATIENT
Start: 2024-03-28 | End: 2024-03-28

## 2024-03-28 RX ORDER — NALOXONE HYDROCHLORIDE 4 MG/.1ML
0.1 SPRAY NASAL
Refills: 0 | Status: DISCONTINUED | OUTPATIENT
Start: 2024-03-28 | End: 2024-03-30

## 2024-03-28 RX ORDER — IBUPROFEN 200 MG
600 TABLET ORAL EVERY 6 HOURS
Refills: 0 | Status: COMPLETED | OUTPATIENT
Start: 2024-03-28 | End: 2025-02-24

## 2024-03-28 RX ORDER — OXYTOCIN 10 UNIT/ML
333.33 VIAL (ML) INJECTION
Qty: 20 | Refills: 0 | Status: DISCONTINUED | OUTPATIENT
Start: 2024-03-28 | End: 2024-03-28

## 2024-03-28 RX ADMIN — Medication 30 MILLIGRAM(S): at 23:32

## 2024-03-28 RX ADMIN — SODIUM CHLORIDE 1000 MILLILITER(S): 9 INJECTION, SOLUTION INTRAVENOUS at 00:10

## 2024-03-28 RX ADMIN — Medication 975 MILLIGRAM(S): at 09:10

## 2024-03-28 RX ADMIN — SODIUM CHLORIDE 125 MILLILITER(S): 9 INJECTION, SOLUTION INTRAVENOUS at 01:36

## 2024-03-28 RX ADMIN — Medication 200 MILLIGRAM(S): at 01:45

## 2024-03-28 RX ADMIN — Medication 30 MILLIGRAM(S): at 05:02

## 2024-03-28 RX ADMIN — FAMOTIDINE 20 MILLIGRAM(S): 10 INJECTION INTRAVENOUS at 01:48

## 2024-03-28 RX ADMIN — Medication 975 MILLIGRAM(S): at 14:25

## 2024-03-28 RX ADMIN — Medication 975 MILLIGRAM(S): at 20:59

## 2024-03-28 RX ADMIN — SODIUM CHLORIDE 2000 MILLILITER(S): 9 INJECTION, SOLUTION INTRAVENOUS at 01:00

## 2024-03-28 RX ADMIN — Medication 975 MILLIGRAM(S): at 21:53

## 2024-03-28 RX ADMIN — Medication 30 MILLIGRAM(S): at 18:38

## 2024-03-28 RX ADMIN — Medication 30 MILLIGRAM(S): at 12:11

## 2024-03-28 RX ADMIN — SODIUM CHLORIDE 125 MILLILITER(S): 9 INJECTION, SOLUTION INTRAVENOUS at 09:10

## 2024-03-28 RX ADMIN — Medication 30 MILLILITER(S): at 01:46

## 2024-03-28 RX ADMIN — Medication 300 MILLIGRAM(S): at 08:13

## 2024-03-28 NOTE — OB PROVIDER DELIVERY SUMMARY - NSPROVIDERDELIVERYNOTE_OBGYN_ALL_OB_FT
See dictation See dictation  Rpt #3 CS +Bilateral salpingectomy See dictation: MRN [id number]  Rpt #3 CS +Bilateral salpingectomy

## 2024-03-28 NOTE — OB RN DELIVERY SUMMARY - NS_SEPSISRSKCALC_OBGYN_ALL_OB_FT
EOS calculated successfully. EOS Risk Factor: 0.5/1000 live births (Formerly named [facility] national incidence); GA=37w4d; Temp=98; ROM=0; GBS='Unknown'; Antibiotics='No antibiotics or any antibiotics < 2 hrs prior to birth'

## 2024-03-28 NOTE — OB PROVIDER H&P - NSLOWPPHRISK_OBGYN_A_OB
Gonzalez Pregnancy/Less than or equal to 4 previous vaginal births/No known bleeding disorder/No history of postpartum hemorrhage

## 2024-03-28 NOTE — OB RN DELIVERY SUMMARY - NSCSDELIVASCHE_OBGYN_ALL_OB
"    [place] [address] [facility] [address]  Phone: [phone]  Fax: [phone]                  Krishan Jeffery   2017 9:45 AM   Office Visit    Description:  Male : 2017   Provider:  Jolly Marquez NP   Department:  [facility]           Reason for Visit     Follow-up           Diagnoses this Visit        Comments    Croup    -  Primary     Immunization due                To Do List           Goals (5 Years of Data)     None      [facility] On [facility]     [facility] -  Assistance  Registered nurses in the [facility] provide clinical advisement, health education, appointment booking, and other advisory services.  Call for this free service at [phone].             Medications                Verify that the below list of medications is an accurate representation of the medications you are currently taking.  If none reported, the list may be blank. If incorrect, please contact your healthcare provider. Carry this list with you in case of emergency.           Current Medications     albuterol 2 mg/5 mL syrup Take 2 mLs (0.8 mg total) by mouth every 8 (eight) hours as needed.    amoxicillin (AMOXIL) 400 mg/5 mL suspension Take 5 mLs (400 mg total) by mouth 2 (two) times daily.    hydrocortisone 2.5 % cream Apply topically 2 (two) times daily. To rash at the torso and face    timolol maleate 0.5% (TIMOPTIC-XE) 0.5 % SolG APPLY 1 SQUIRT TO FACE DAILY           Clinical Reference Information           Your Vitals Were     Pulse Temp Resp Height Weight BMI    102 98.6 °F (37 °C) (Tympanic) 22 2' 6" (0.762 m) 11 kg (24 lb 4 oz) 18.94 kg/m2      Allergies as of 2017     No Known Allergies      Immunizations Administered on Date of Encounter - 2017     Name Date Dose VIS Date Route    DTAP  Incomplete 0.5 mL 2007 Intramuscular      Orders Placed During Today's Visit      Normal Orders This Visit    DTaP " Vaccine (5 Pertussis Antigens) (Pediatric) (IM)       Language Assistance Services     ATTENTION: Language assistance services are available, free of charge. Please call [phone].      ATENCIÓN: Si wilfrido brown, tiene a pena disposición servicios gratuitos de asistencia lingüística. Llame al [phone].     CHÚ Ý: N?u b?n nói Ti?ng Vi?t, có các d?ch v? h? tr? ngôn ng? mi?n phí dành cho b?n. G?i s? [phone].         [facility] complies with applicable Federal civil rights laws and does not discriminate on the basis of race, color, national origin, age, disability, or sex.         Unscheduled

## 2024-03-28 NOTE — OB RN PATIENT PROFILE - NS_PRENATALHARD_OBGYN_ALL_OB
What Type Of Note Output Would You Prefer (Optional)?: Standard Output
How Severe Are Your Spot(S)?: mild
Have Your Spot(S) Been Treated In The Past?: has not been treated
Hpi Title: Evaluation of Skin Lesions
Additional History: Patient has been given permethrin months ago when he was diagnosed with scabies. He used permethrin for months and he states the bites are gone but he gets “nodular and white pustules”. He also states that the bumps come and go and are mostly felt at night. Then patient states they suspected a form of acne and he used Vaseline as recommended by PCP. A biopsy was performed by a dermatologist at [address] [place] [address] and he states it was reported as a possible “atopic dermatitis”. He was given triamcinolone cream but states it did not help. He was told to use head and shoulders shampoo on his scalp.
Available

## 2024-03-28 NOTE — OB RN DELIVERY SUMMARY - NSSELHIDDEN_OBGYN_ALL_OB_FT
[NS_DeliveryAttending1_OBGYN_ALL_OB_FT:[id number]],[NS_DeliveryRN_OBGYN_ALL_OB_FT:[id number]] [NS_DeliveryAttending1_OBGYN_ALL_OB_FT:[id number]],[NS_DeliveryRN_OBGYN_ALL_OB_FT:[id number]=],[NS_DeliveryAssist1_OBGYN_ALL_OB_FT:[id number]=]

## 2024-03-28 NOTE — OB PROVIDER DELIVERY SUMMARY - NSSELHIDDEN_OBGYN_ALL_OB_FT
[NS_DeliveryAttending1_OBGYN_ALL_OB_FT:[id number]==],[NS_DeliveryAssist1_OBGYN_ALL_OB_FT:[id number]=]

## 2024-03-28 NOTE — LACTATION INITIAL EVALUATION - NS LACT CON REASON FOR REQ
MEt with 37.4 wks dyad at ablut 8 hours old. Mother has only givine formula feedings since delivery but reports she plans to begin breastfeeding. Pt states she offered the breast but the baby was sleepy and didn't latch. At the timeof LC visit the pt has visitors at the bedside and someone if attempting to formula feed the baby. I offered pt latch assistance but she declines. Pt was made aware that she can request a follow up consult at anytime and that she can request latch assistance whenever she is ready to begin breastfeeding her baby. Pt verbalized understanding instruction, pt primary nurse updated on pt status and plan./general questions without assessment/multiparous mom/early term/late  infant/staff request

## 2024-03-28 NOTE — LACTATION INITIAL EVALUATION - ACTUAL PROBLEM
declined latch assistance when offered/ineffective breastfeeding/feeding confusion/latch on difficulty

## 2024-03-28 NOTE — PRE-ANESTHESIA EVALUATION ADULT - NSANTHADDINFOFT_GEN_ALL_CORE
Last light meal was at 1900 3/27/24, but OB calling CS emergent and does not want to wait for NPO protocol so will proceed with full stomach precautions if general anesthesia required. Discussed risks and benefits of neuraxial anesthesia vs. general anesthesia with patient and  including aspiration risk.    Discussed the plan of general anesthesia and explained all of the risks and benefits of general anesthesia- including risk of cardiopulmonary compromise, eye injury, sore throat, airway injury, postoperative nausea and vomiting, and nerve injury. All questions were answered and patient is in agreement for general anesthesia

## 2024-03-28 NOTE — OB RN INTRAOPERATIVE NOTE - NSSELHIDDEN_OBGYN_ALL_OB_FT
[NS_DeliveryAttending1_OBGYN_ALL_OB_FT:[id number]==],[NS_DeliveryAssist1_OBGYN_ALL_OB_FT:[id number]],[NS_DeliveryRN_OBGYN_ALL_OB_FT:[id number]=]

## 2024-03-28 NOTE — OB PROVIDER TRIAGE NOTE - NSOBPROVIDERNOTE_OBGYN_ALL_OB_FT
Subjective:  HPI: 33y Female  at 37w4d presents for low pelvic pain for 2 days duration of waxing and waning course. Now, she reports intermittent cramping at her incision site every 5-6 minutes, rated 7/10 pain intensity. She denies headache, changes in vision, RUQ pain, dysuria, frequency, urgency  - Fetal movement: endorses  - Contractions: as above  - Leakage of fluid: denies  - Vaginal bleeding: denies    Antepartum:  - Pregnancy type: spontaneous  - Testing: NIPT wnl   - Anatomy scan: wnl  - GCT/GTT: passed  - Hypertensive disorders of pregnancy: chronic hypertension (diagnosed ; currently taking labetalol 300mg BID; good control at home)  - GBS status: negative  - EFW: (3/26) 3488g    - ObHx: G1  primary C/S for arrest of dilation, BW 3500g; G2 2017 C/S  - GynHx: denies  - PMH: denies  - PSH: denies  - SH: denies toxic habits  - Meds: Prenatal vitamins; Labetalol 300mg BID  - Allergies: No Known Allergies        Objective:    T(C): 36.7 (24 @ 23:30), Max: 36.7 (24 @ 22:33)  HR: 102 (24 @ 23:30) (102 - 108)  BP: 127/85 (24 @ 23:30) (127/85 - 144/97)  RR: 18 (24 @ 23:30) (18 - 19)  SpO2: 97% (24 @ 22:33) (97% - 97%)    General: No apparent distress; Lying comfortably in bed  Pulmonary: No increased work of breathing  Abdomen: Soft; Nontender; Gravid  Extremities: Trace pedal edema bilaterally; No calf tenderness bilaterally  Neurological: Gross movements intact  Psychiatric: Appropriate mood and affect  Skin: No rashes or jaundice    SVE: closed/long  TAUS: cephalic fetus; posterior placenta; BPP 8/8; DVO 3cm  (sonogram attached)    FHT: baseline 140; moderate variability; +accels; -decels; reactive and reassuring NST  TOCO: irregular contractions 1 in 10min            Assessment:        Plan:  -   - All questions were answered and concerns addressed. Patient verbally expressed understanding.  - Discussed with senior resident   - Discussed with attending physician Subjective:  HPI: 33y Female  at 37w4d presents for low pelvic pain for 2 days duration of waxing and waning course. Now, she reports intermittent cramping at her incision site every 5-6 minutes, rated 7/10 pain intensity. She denies headache, changes in vision, RUQ pain, dysuria, frequency, urgency  - Fetal movement: endorses  - Contractions: as above  - Leakage of fluid: denies  - Vaginal bleeding: denies    Antepartum:  - Pregnancy type: spontaneous  - Testing: NIPT wnl   - Anatomy scan: wnl  - GCT/GTT: passed  - Hypertensive disorders of pregnancy: chronic hypertension (diagnosed ; currently taking labetalol 300mg BID; good control at home)  - GBS status: negative  - EFW: (3/26) 3488g    - ObHx: G1  primary C/S for arrest of dilation, BW 3500g; G2 2017 C/S  - GynHx: denies  - PMH: denies  - PSH: denies  - SH: denies toxic habits  - Meds: Prenatal vitamins; Labetalol 300mg BID  - Allergies: No Known Allergies        Objective:    T(C): 36.7 (24 @ 23:30), Max: 36.7 (24 @ 22:33)  HR: 102 (24 @ 23:30) (102 - 108)  BP: 127/85 (24 @ 23:30) (127/85 - 144/97)  RR: 18 (24 @ 23:30) (18 - 19)  SpO2: 97% (24 @ 22:33) (97% - 97%)    General: No apparent distress; Lying comfortably in bed  Pulmonary: No increased work of breathing  Abdomen: Soft; Nontender; Gravid  Extremities: Trace pedal edema bilaterally; No calf tenderness bilaterally  Neurological: Gross movements intact  Psychiatric: Appropriate mood and affect  Skin: No rashes or jaundice    SVE: closed/long  TAUS: cephalic fetus; posterior placenta; BPP 8/8; DVO 3cm  (sonogram attached)    FHT: baseline 140; moderate variability; +accels; -decels; reactive and reassuring NST  TOCO: irregular contractions 1 in 10min            Assessment:  32 yo  at 37w4d presents for incision site pain with PSH  delivery x2. Incisional site pain did not improve with hydration, and there is no evidence of stone or infectious process. Maternal and fetal statuses are reassuring yet suspicion remains high for uterine rupture. Discussed benefist vs risks of proceeding with repeat  delivery vs continuing preganany w/ expectant management. Patient and partner at bedside express understanding and would like to heed recommendation of hospital team in proceeding with  delivery. Patient also desires bilateral tubal ligation and presents with papers present.      Plan:  - Admit to LD  - Diet NPO  - LR 125cc/hr  - Ancef 2g for surgical prophylaxis  - Spinal anesthesia  - Consents signed  - All questions were answered and concerns addressed. Patient verbally expressed understanding.  - Discussed with senior resident Dr. Ocampo  - Discussed with attending physician Dr. Pascual

## 2024-03-28 NOTE — OB RN PATIENT PROFILE - FALL HARM RISK - UNIVERSAL INTERVENTIONS
Bed in lowest position, wheels locked, appropriate side rails in place/Call bell, personal items and telephone in reach/Instruct patient to call for assistance before getting out of bed or chair/Non-slip footwear when patient is out of bed/[place] to call system/Physically safe environment - no spills, clutter or unnecessary equipment/Purposeful Proactive Rounding/Room/bathroom lighting operational, light cord in reach

## 2024-03-28 NOTE — OB PROVIDER H&P - HISTORY OF PRESENT ILLNESS
Impression: Dermatochalasis of left upper lid: H02.834. Plan: Same as plan #2.
Impression: Dermatochalasis of right upper lid: H02.831. Plan: Patient advised that they have droopy eyelids and may be a candidate for surgery. Ptosis VF testing was offered. They decline testing at this time.
Impression: Nonexudative macular degeneration, early dry stage, bilateral: H35.9627. Plan: Patient advised that their macular degeneration appears stable. They are advised to continue AREDS/AREDS2 and the Amsler grid. We will continue to monitor periodically; call if any changes noted.
Subjective:  HPI: 33y Female  at 37w4d presents for low pelvic pain for 2 days duration of waxing and waning course. Now, she reports intermittent cramping at her incision site every 5-6 minutes, rated 7/10 pain intensity. She denies headache, changes in vision, RUQ pain, dysuria, frequency, urgency  - Fetal movement: endorses  - Contractions: as above  - Leakage of fluid: denies  - Vaginal bleeding: denies    Antepartum:  - Pregnancy type: spontaneous  - Testing: NIPT wnl   - Anatomy scan: wnl  - GCT/GTT: passed  - Hypertensive disorders of pregnancy: chronic hypertension (diagnosed ; currently taking labetalol 300mg BID; good control at home)  - GBS status: negative  - EFW: (3/26) 3488g    - ObHx: G1  primary C/S for arrest of dilation, BW 3500g; G2 2017 C/S  - GynHx: denies  - PMH: denies  - PSH: denies  - SH: denies toxic habits  - Meds: Prenatal vitamins; Labetalol 300mg BID  - Allergies: No Known Allergies        Objective:    T(C): 36.7 (24 @ 23:30), Max: 36.7 (24 @ 22:33)  HR: 102 (24 @ 23:30) (102 - 108)  BP: 127/85 (24 @ 23:30) (127/85 - 144/97)  RR: 18 (24 @ 23:30) (18 - 19)  SpO2: 97% (24 @ 22:33) (97% - 97%)    General: No apparent distress; Lying comfortably in bed  Pulmonary: No increased work of breathing  Abdomen: Soft; Nontender; Gravid  Extremities: Trace pedal edema bilaterally; No calf tenderness bilaterally  Neurological: Gross movements intact  Psychiatric: Appropriate mood and affect  Skin: No rashes or jaundice    SVE: closed/long  TAUS: cephalic fetus; posterior placenta; BPP 8/8; DVO 3cm  (sonogram attached)    FHT: baseline 140; moderate variability; +accels; -decels; reactive and reassuring NST  TOCO: irregular contractions 1 in 10min            Assessment:  34 yo  at 37w4d presents for incision site pain with PSH  delivery x2. Incisional site pain did not improve with hydration, and there is no evidence of stone or infectious process. Maternal and fetal statuses are reassuring yet suspicion remains high for uterine rupture. Discussed benefist vs risks of proceeding with repeat  delivery vs continuing preganany w/ expectant management. Patient and partner at bedside express understanding and would like to heed recommendation of hospital team in proceeding with  delivery. Patient also desires bilateral tubal ligation and presents with papers present.      Plan:  - Admit to LD  - Diet NPO  - LR 125cc/hr  - Ancef 2g for surgical prophylaxis  - Spinal anesthesia  - Consents signed  - All questions were answered and concerns addressed. Patient verbally expressed understanding.  - Discussed with senior resident Dr. Ocampo  - Discussed with attending physician Dr. Pascual

## 2024-03-29 ENCOUNTER — TRANSCRIPTION ENCOUNTER (OUTPATIENT)
Age: 34
End: 2024-03-29

## 2024-03-29 LAB
BASOPHILS # BLD AUTO: 0.02 K/UL — SIGNIFICANT CHANGE UP (ref 0–0.2)
BASOPHILS NFR BLD AUTO: 0.2 % — SIGNIFICANT CHANGE UP (ref 0–2)
EOSINOPHIL # BLD AUTO: 0.12 K/UL — SIGNIFICANT CHANGE UP (ref 0–0.5)
EOSINOPHIL NFR BLD AUTO: 1.1 % — SIGNIFICANT CHANGE UP (ref 0–6)
HCT VFR BLD CALC: 31.5 % — LOW (ref 34.5–45)
HGB BLD-MCNC: 9.9 G/DL — LOW (ref 11.5–15.5)
IMM GRANULOCYTES NFR BLD AUTO: 0.6 % — SIGNIFICANT CHANGE UP (ref 0–0.9)
LYMPHOCYTES # BLD AUTO: 2.81 K/UL — SIGNIFICANT CHANGE UP (ref 1–3.3)
LYMPHOCYTES # BLD AUTO: 26.4 % — SIGNIFICANT CHANGE UP (ref 13–44)
MCHC RBC-ENTMCNC: 27.3 PG — SIGNIFICANT CHANGE UP (ref 27–34)
MCHC RBC-ENTMCNC: 31.4 GM/DL — LOW (ref 32–36)
MCV RBC AUTO: 87 FL — SIGNIFICANT CHANGE UP (ref 80–100)
MONOCYTES # BLD AUTO: 1.15 K/UL — HIGH (ref 0–0.9)
MONOCYTES NFR BLD AUTO: 10.8 % — SIGNIFICANT CHANGE UP (ref 2–14)
NEUTROPHILS # BLD AUTO: 6.5 K/UL — SIGNIFICANT CHANGE UP (ref 1.8–7.4)
NEUTROPHILS NFR BLD AUTO: 60.9 % — SIGNIFICANT CHANGE UP (ref 43–77)
NRBC # BLD: 0 /100 WBCS — SIGNIFICANT CHANGE UP (ref 0–0)
PLATELET # BLD AUTO: 304 K/UL — SIGNIFICANT CHANGE UP (ref 150–400)
RBC # BLD: 3.62 M/UL — LOW (ref 3.8–5.2)
RBC # FLD: 15.1 % — HIGH (ref 10.3–14.5)
T PALLIDUM AB TITR SER: NEGATIVE — SIGNIFICANT CHANGE UP
WBC # BLD: 10.66 K/UL — HIGH (ref 3.8–10.5)
WBC # FLD AUTO: 10.66 K/UL — HIGH (ref 3.8–10.5)

## 2024-03-29 RX ORDER — ACETAMINOPHEN 500 MG
3 TABLET ORAL
Qty: 0 | Refills: 0 | DISCHARGE
Start: 2024-03-29

## 2024-03-29 RX ORDER — IBUPROFEN 200 MG
600 TABLET ORAL EVERY 6 HOURS
Refills: 0 | Status: DISCONTINUED | OUTPATIENT
Start: 2024-03-29 | End: 2024-03-30

## 2024-03-29 RX ORDER — ENOXAPARIN SODIUM 100 MG/ML
40 INJECTION SUBCUTANEOUS EVERY 24 HOURS
Refills: 0 | Status: DISCONTINUED | OUTPATIENT
Start: 2024-03-29 | End: 2024-03-30

## 2024-03-29 RX ORDER — IBUPROFEN 200 MG
1 TABLET ORAL
Qty: 0 | Refills: 0 | DISCHARGE
Start: 2024-03-29

## 2024-03-29 RX ADMIN — Medication 600 MILLIGRAM(S): at 06:26

## 2024-03-29 RX ADMIN — ENOXAPARIN SODIUM 40 MILLIGRAM(S): 100 INJECTION SUBCUTANEOUS at 07:28

## 2024-03-29 RX ADMIN — Medication 975 MILLIGRAM(S): at 03:20

## 2024-03-29 RX ADMIN — Medication 975 MILLIGRAM(S): at 19:38

## 2024-03-29 RX ADMIN — Medication 30 MILLIGRAM(S): at 00:00

## 2024-03-29 RX ADMIN — Medication 600 MILLIGRAM(S): at 12:21

## 2024-03-29 RX ADMIN — Medication 300 MILLIGRAM(S): at 19:48

## 2024-03-29 RX ADMIN — Medication 975 MILLIGRAM(S): at 20:28

## 2024-03-29 RX ADMIN — Medication 600 MILLIGRAM(S): at 17:45

## 2024-03-29 RX ADMIN — Medication 975 MILLIGRAM(S): at 03:27

## 2024-03-29 RX ADMIN — Medication 600 MILLIGRAM(S): at 23:57

## 2024-03-29 RX ADMIN — Medication 975 MILLIGRAM(S): at 21:00

## 2024-03-29 RX ADMIN — Medication 975 MILLIGRAM(S): at 09:33

## 2024-03-29 RX ADMIN — Medication 600 MILLIGRAM(S): at 06:44

## 2024-03-29 RX ADMIN — Medication 975 MILLIGRAM(S): at 15:25

## 2024-03-29 NOTE — DISCHARGE NOTE OB - CARE PROVIDER_API CALL
Pankaj Sánchez  Obstetrics and Gynecology  [address] [place] [address]  Phone: [phone]  Fax: [phone]  Established Patient  Follow Up Time:

## 2024-03-29 NOTE — DISCHARGE NOTE OB - CARE PLAN
1 Principal Discharge DX:	Pregnancy, delivered  Assessment and plan of treatment:	- Take Motrin 600mg every 6 hours and/or tylenol 650mg every 6 hours as needed for pain.    - Call your OBGYN to schedule a follow up appointment in 1-2weeks.   - Keep incision site clean and dry.   - Call your OBGYN if you experience severe abdominal pain not improved by oral pain medications, heavy bright red vaginal bleeding saturating more than 1 pad per hour, redness/warmth at incision site, drainage from incision site, or fever greater than 100.4F.  Secondary Diagnosis:	Chronic hypertension  Assessment and plan of treatment:	- Continue taking home labetalol 300mg twice per day   - Follow up with your OB in one week for a blood pressure check (call to confirm appointment).  - Check blood pressure 3x a day and write it down (bring to your doctor's appointment).   - If BP >150/100 please call your doctor.   - If BP >160/110, you develop a headache not relieved by tylenol, visual disturbances, or have right upper abdominal pain, call your doctor or the hospital, or go to your nearest emergency room.   - Regular diet, normal activity, nothing in the vagina for 6 weeks--no sex, tampons, tub baths, or swimming pools.

## 2024-03-29 NOTE — DISCHARGE NOTE OB - HOSPITAL COURSE
Patient u33 yo  s/p rC/S and BTL at 37w4d after p/w incisional site pain also c/b chronic HTN. Vitals were monitored closely and she remained normotensive on home labetalol 300 BID. Patient’s postoperative course was unremarkable and she remained hemodynamically stable and afebrile throughout. Upon discharge the patient is ambulating without assistance, voiding spontaneously, tolerating oral intake. Pain is well controlled with oral medication and vital signs are stable.

## 2024-03-29 NOTE — DISCHARGE NOTE OB - PATIENT PORTAL LINK FT
You can access the FollowMyHealth Patient Portal offered by [facility] by registering at the following website: [email or web address]. By joining [employer]’s FollowMyHealth portal, you will also be able to view your health information using other applications (apps) compatible with our system.

## 2024-03-29 NOTE — DISCHARGE NOTE OB - PLAN OF CARE
- Continue taking home labetalol 300mg twice per day   - Follow up with your OB in one week for a blood pressure check (call to confirm appointment).  - Check blood pressure 3x a day and write it down (bring to your doctor's appointment).   - If BP >150/100 please call your doctor.   - If BP >160/110, you develop a headache not relieved by tylenol, visual disturbances, or have right upper abdominal pain, call your doctor or the hospital, or go to your nearest emergency room.   - Regular diet, normal activity, nothing in the vagina for 6 weeks--no sex, tampons, tub baths, or swimming pools. - Take Motrin 600mg every 6 hours and/or tylenol 650mg every 6 hours as needed for pain.    - Call your OBGYN to schedule a follow up appointment in 1-2weeks.   - Keep incision site clean and dry.   - Call your OBGYN if you experience severe abdominal pain not improved by oral pain medications, heavy bright red vaginal bleeding saturating more than 1 pad per hour, redness/warmth at incision site, drainage from incision site, or fever greater than 100.4F.

## 2024-03-29 NOTE — DISCHARGE NOTE OB - MEDICATION SUMMARY - MEDICATIONS TO TAKE
I will START or STAY ON the medications listed below when I get home from the hospital:    ibuprofen 600 mg oral tablet  -- 1 tab(s) by mouth every 6 hours  -- Indication: For pain    acetaminophen 325 mg oral tablet  -- 3 tab(s) by mouth every 6 hours  -- Indication: For pain    labetalol 300 mg oral tablet  -- 1 tab(s) by mouth 2 times a day  -- Indication: For blood pressure

## 2024-03-30 VITALS
SYSTOLIC BLOOD PRESSURE: 110 MMHG | HEART RATE: 103 BPM | DIASTOLIC BLOOD PRESSURE: 75 MMHG | TEMPERATURE: 98 F | RESPIRATION RATE: 17 BRPM | OXYGEN SATURATION: 96 %

## 2024-03-30 PROCEDURE — 86901 BLOOD TYPING SEROLOGIC RH(D): CPT

## 2024-03-30 PROCEDURE — 83615 LACTATE (LD) (LDH) ENZYME: CPT

## 2024-03-30 PROCEDURE — 85730 THROMBOPLASTIN TIME PARTIAL: CPT

## 2024-03-30 PROCEDURE — 88304 TISSUE EXAM BY PATHOLOGIST: CPT

## 2024-03-30 PROCEDURE — 88307 TISSUE EXAM BY PATHOLOGIST: CPT

## 2024-03-30 PROCEDURE — 86780 TREPONEMA PALLIDUM: CPT

## 2024-03-30 PROCEDURE — 82570 ASSAY OF URINE CREATININE: CPT

## 2024-03-30 PROCEDURE — 85610 PROTHROMBIN TIME: CPT

## 2024-03-30 PROCEDURE — 84550 ASSAY OF BLOOD/URIC ACID: CPT

## 2024-03-30 PROCEDURE — 81001 URINALYSIS AUTO W/SCOPE: CPT

## 2024-03-30 PROCEDURE — 80053 COMPREHEN METABOLIC PANEL: CPT

## 2024-03-30 PROCEDURE — 84156 ASSAY OF PROTEIN URINE: CPT

## 2024-03-30 PROCEDURE — 59050 FETAL MONITOR W/REPORT: CPT

## 2024-03-30 PROCEDURE — 36415 COLL VENOUS BLD VENIPUNCTURE: CPT

## 2024-03-30 PROCEDURE — 86900 BLOOD TYPING SEROLOGIC ABO: CPT

## 2024-03-30 PROCEDURE — 85384 FIBRINOGEN ACTIVITY: CPT

## 2024-03-30 PROCEDURE — 85025 COMPLETE CBC W/AUTO DIFF WBC: CPT

## 2024-03-30 PROCEDURE — 86850 RBC ANTIBODY SCREEN: CPT

## 2024-03-30 RX ORDER — LABETALOL HCL 100 MG
1 TABLET ORAL
Qty: 60 | Refills: 1
Start: 2024-03-30 | End: 2024-05-28

## 2024-03-30 RX ADMIN — Medication 600 MILLIGRAM(S): at 12:34

## 2024-03-30 RX ADMIN — Medication 975 MILLIGRAM(S): at 09:32

## 2024-03-30 RX ADMIN — Medication 975 MILLIGRAM(S): at 14:33

## 2024-03-30 RX ADMIN — Medication 300 MILLIGRAM(S): at 07:35

## 2024-03-30 RX ADMIN — Medication 975 MILLIGRAM(S): at 03:24

## 2024-03-30 RX ADMIN — Medication 600 MILLIGRAM(S): at 05:40

## 2024-03-30 RX ADMIN — Medication 600 MILLIGRAM(S): at 00:57

## 2024-03-30 RX ADMIN — Medication 600 MILLIGRAM(S): at 06:41

## 2024-03-30 RX ADMIN — Medication 975 MILLIGRAM(S): at 09:03

## 2024-03-30 RX ADMIN — ENOXAPARIN SODIUM 40 MILLIGRAM(S): 100 INJECTION SUBCUTANEOUS at 07:32

## 2024-03-30 RX ADMIN — Medication 975 MILLIGRAM(S): at 03:48

## 2024-03-30 NOTE — PROGRESS NOTE ADULT - ASSESSMENT
A/P 33yF s/p  #3, POD #1, stable  -  Pain: PO motrin & tylenol, oxycodone available for severe pain PRN  -  Post-operatively labs: post-op Hgb 11.8, hemodynamically stable, no symptoms of anemia   -  Pulm: encourage ISS use  -  GI: regulars, passing gas  -  : voiding  -  DVT prophylaxis: ambulation, SCDs, lovenox  -  Dispo: POD 2 or 3
A/P 33yF s/p  #3 w/ cHTN, POD #2, stable  -  cHTN on labetalol 300BID, Normotensive and asymptomatic. Will continue to monitor VSq4h.   - Pain: PO motrin & tylenol, oxycodone available for severe pain PRN  -  Post-operatively labs: post-op Hgb 11.8, hemodynamically stable, no symptoms of anemia   -  Pulm: encourage ISS use  -  GI: regulars, passing gas  -  : voiding  -  DVT prophylaxis: ambulation, SCDs, lovenox  -  Dispo: POD 2 or 3  [employer] PGY4
4 = No assist / stand by assistance

## 2024-03-30 NOTE — PROGRESS NOTE ADULT - SUBJECTIVE AND OBJECTIVE BOX
Patient evaluated at bedside this morning, resting comfortable in bed, with no acute events overnight. Reports pain is well controlled. She denies headache, dizziness, CP, palpitations, SOB, N/V, or heavy vaginal bleeding.    She has ambulated since procedure, voiding, Tolerating regular diet. +flatus, -bm    Physical Exam:  Vital Signs Last 24 Hrs  T(C): 36.7 (29 Mar 2024 06:19), Max: 37.2 (28 Mar 2024 18:00)  T(F): 98.1 (29 Mar 2024 06:19), Max: 98.9 (28 Mar 2024 18:00)  HR: 97 (29 Mar 2024 06:19) (82 - 97)  BP: 113/76 (29 Mar 2024 06:19) (101/65 - 113/76)  RR: 18 (29 Mar 2024 06:19) (17 - 18)  SpO2: 98% (29 Mar 2024 06:19) (96% - 98%)    Parameters below as of 28 Mar 2024 13:41  Patient On (Oxygen Delivery Method): room air    Gen: NAD, A&0 x 3  Pulm: no incr. WOB  Abd: soft, nontender, mildy distended, no rebound or guarding, incision clean, dry and intact, uterus firm at midline, 2 fb below umbilicus  : voiding, lochia WNL  Extremities: no swelling or calf tenderness, SCDs in place                          11.8   16.46 )-----------( 327      ( 28 Mar 2024 08:07 )             36.2     03-27    137  |  104  |  7   ----------------------------<  107<H>  4.0   |  21<L>  |  0.51    Ca    9.5      27 Mar 2024 23:46    TPro  6.8  /  Alb  3.7  /  TBili  0.2  /  DBili  x   /  AST  16  /  ALT  24  /  AlkPhos  146<H>  03-27      PT/INR - ( 27 Mar 2024 23:46 )   PT: 10.5 sec;   INR: 0.92          PTT - ( 27 Mar 2024 23:46 )  PTT:25.5 sec  acetaminophen     Tablet .. 975 milliGRAM(s) Oral <User Schedule>  chlorhexidine 2% Cloths 1 Application(s) Topical daily  dexAMETHasone  Injectable 4 milliGRAM(s) IV Push every 6 hours PRN  diphenhydrAMINE 25 milliGRAM(s) Oral every 6 hours PRN  diphtheria/tetanus/pertussis (acellular) Vaccine (Adacel) 0.5 milliLiter(s) IntraMuscular once  ibuprofen  Tablet. 600 milliGRAM(s) Oral every 6 hours  labetalol 300 milliGRAM(s) Oral every 12 hours  lactated ringers. 1000 milliLiter(s) IV Continuous <Continuous>  lactated ringers. 1000 milliLiter(s) IV Continuous <Continuous>  lanolin Ointment 1 Application(s) Topical every 6 hours PRN  magnesium hydroxide Suspension 30 milliLiter(s) Oral two times a day PRN  naloxone Injectable 0.1 milliGRAM(s) IV Push every 3 minutes PRN  ondansetron Injectable 4 milliGRAM(s) IV Push every 6 hours PRN  oxyCODONE    IR 5 milliGRAM(s) Oral every 3 hours PRN  oxytocin Infusion 333.333 milliUNIT(s)/Min IV Continuous <Continuous>  simethicone 80 milliGRAM(s) Chew every 4 hours PRN  
Patient evaluated at bedside this morning, resting comfortable in bed, with no acute events overnight. Reports pain is well controlled. She denies headache, dizziness, CP, palpitations, SOB, N/V, or heavy vaginal bleeding.    She has ambulated since procedure, voiding, Tolerating regular diet. +flatus, -bm    Physical Exam:  Vital Signs Last 24 Hrs  T(C): 36.7 (30 Mar 2024 05:53), Max: 37.1 (30 Mar 2024 02:00)  T(F): 98 (30 Mar 2024 05:53), Max: 98.7 (30 Mar 2024 02:00)  HR: 80 (30 Mar 2024 05:53) (80 - 97)  BP: 110/74 (30 Mar 2024 05:53) (104/69 - 116/71)  RR: 18 (30 Mar 2024 05:53) (16 - 18)  SpO2: 97% (30 Mar 2024 05:53) (96% - 97%)    O2 Parameters below as of 29 Mar 2024 18:43  Patient On (Oxygen Delivery Method): room air    Gen: NAD, A&0 x 3  Pulm: no incr. WOB  Abd: soft, nontender, mildly distended, no rebound or guarding, incision clean, dry and intact, uterus firm at midline, 2 fb below umbilicus  : voiding, lochia WNL  Extremities: no swelling or calf tenderness, SCDs in place                          11.8   16.46 )-----------( 327      ( 28 Mar 2024 08:07 )             36.2     03-27    137  |  104  |  7   ----------------------------<  107<H>  4.0   |  21<L>  |  0.51    Ca    9.5      27 Mar 2024 23:46    TPro  6.8  /  Alb  3.7  /  TBili  0.2  /  DBili  x   /  AST  16  /  ALT  24  /  AlkPhos  146<H>  03-27      PT/INR - ( 27 Mar 2024 23:46 )   PT: 10.5 sec;   INR: 0.92          PTT - ( 27 Mar 2024 23:46 )  PTT:25.5 sec  acetaminophen     Tablet .. 975 milliGRAM(s) Oral <User Schedule>  chlorhexidine 2% Cloths 1 Application(s) Topical daily  dexAMETHasone  Injectable 4 milliGRAM(s) IV Push every 6 hours PRN  diphenhydrAMINE 25 milliGRAM(s) Oral every 6 hours PRN  diphtheria/tetanus/pertussis (acellular) Vaccine (Adacel) 0.5 milliLiter(s) IntraMuscular once  ibuprofen  Tablet. 600 milliGRAM(s) Oral every 6 hours  labetalol 300 milliGRAM(s) Oral every 12 hours  lactated ringers. 1000 milliLiter(s) IV Continuous <Continuous>  lactated ringers. 1000 milliLiter(s) IV Continuous <Continuous>  lanolin Ointment 1 Application(s) Topical every 6 hours PRN  magnesium hydroxide Suspension 30 milliLiter(s) Oral two times a day PRN  naloxone Injectable 0.1 milliGRAM(s) IV Push every 3 minutes PRN  ondansetron Injectable 4 milliGRAM(s) IV Push every 6 hours PRN  oxyCODONE    IR 5 milliGRAM(s) Oral every 3 hours PRN  oxytocin Infusion 333.333 milliUNIT(s)/Min IV Continuous <Continuous>  simethicone 80 milliGRAM(s) Chew every 4 hours PRN

## 2024-04-03 DIAGNOSIS — O34.211 MATERNAL CARE FOR LOW TRANSVERSE SCAR FROM PREVIOUS CESAREAN DELIVERY: ICD-10-CM

## 2024-04-03 DIAGNOSIS — Z3A.37 37 WEEKS GESTATION OF PREGNANCY: ICD-10-CM

## 2024-04-08 PROBLEM — Z00.00 ENCOUNTER FOR PREVENTIVE HEALTH EXAMINATION: Status: ACTIVE | Noted: 2024-04-08
